# Patient Record
Sex: FEMALE | Race: WHITE | Employment: FULL TIME | URBAN - METROPOLITAN AREA
[De-identification: names, ages, dates, MRNs, and addresses within clinical notes are randomized per-mention and may not be internally consistent; named-entity substitution may affect disease eponyms.]

---

## 2021-08-17 ENCOUNTER — TRANSCRIBE ORDER (OUTPATIENT)
Dept: SCHEDULING | Age: 51
End: 2021-08-17

## 2021-08-17 DIAGNOSIS — Z12.31 VISIT FOR SCREENING MAMMOGRAM: Primary | ICD-10-CM

## 2021-08-28 ENCOUNTER — HOSPITAL ENCOUNTER (OUTPATIENT)
Dept: MAMMOGRAPHY | Age: 51
Discharge: HOME | End: 2021-08-28
Attending: OBSTETRICS & GYNECOLOGY
Payer: COMMERCIAL

## 2021-08-28 DIAGNOSIS — Z12.31 VISIT FOR SCREENING MAMMOGRAM: ICD-10-CM

## 2021-08-28 PROCEDURE — 77067 SCR MAMMO BI INCL CAD: CPT

## 2022-12-07 ENCOUNTER — HOSPITAL ENCOUNTER (EMERGENCY)
Dept: CT IMAGING | Age: 52
Discharge: HOME | End: 2022-12-10
Payer: COMMERCIAL

## 2022-12-07 ENCOUNTER — HOSPITAL ENCOUNTER (EMERGENCY)
Age: 52
Discharge: HOME | End: 2022-12-07
Attending: STUDENT IN AN ORGANIZED HEALTH CARE EDUCATION/TRAINING PROGRAM
Payer: COMMERCIAL

## 2022-12-07 VITALS
DIASTOLIC BLOOD PRESSURE: 66 MMHG | OXYGEN SATURATION: 98 % | HEART RATE: 87 BPM | RESPIRATION RATE: 18 BRPM | HEIGHT: 62 IN | SYSTOLIC BLOOD PRESSURE: 134 MMHG | TEMPERATURE: 98.4 F | BODY MASS INDEX: 21.35 KG/M2 | WEIGHT: 116 LBS

## 2022-12-07 DIAGNOSIS — H57.02 ANISOCORIA: Primary | ICD-10-CM

## 2022-12-07 LAB
ANION GAP SERPL CALC-SCNC: 6 MMOL/L (ref 2–11)
BUN SERPL-MCNC: 8 MG/DL (ref 6–23)
CALCIUM SERPL-MCNC: 9.7 MG/DL (ref 8.3–10.4)
CHLORIDE SERPL-SCNC: 100 MMOL/L (ref 101–110)
CO2 SERPL-SCNC: 30 MMOL/L (ref 21–32)
CREAT SERPL-MCNC: 0.74 MG/DL (ref 0.6–1)
GLUCOSE SERPL-MCNC: 101 MG/DL (ref 65–100)
POTASSIUM SERPL-SCNC: 3.9 MMOL/L (ref 3.5–5.1)
SODIUM SERPL-SCNC: 136 MMOL/L (ref 133–143)

## 2022-12-07 PROCEDURE — 80048 BASIC METABOLIC PNL TOTAL CA: CPT

## 2022-12-07 PROCEDURE — 2580000003 HC RX 258: Performed by: EMERGENCY MEDICINE

## 2022-12-07 PROCEDURE — 6360000004 HC RX CONTRAST MEDICATION: Performed by: EMERGENCY MEDICINE

## 2022-12-07 PROCEDURE — 99284 EMERGENCY DEPT VISIT MOD MDM: CPT

## 2022-12-07 PROCEDURE — 99283 EMERGENCY DEPT VISIT LOW MDM: CPT | Performed by: PSYCHIATRY & NEUROLOGY

## 2022-12-07 PROCEDURE — 70450 CT HEAD/BRAIN W/O DYE: CPT

## 2022-12-07 PROCEDURE — 70496 CT ANGIOGRAPHY HEAD: CPT

## 2022-12-07 RX ORDER — SODIUM CHLORIDE 0.9 % (FLUSH) 0.9 %
10 SYRINGE (ML) INJECTION
Status: DISCONTINUED | OUTPATIENT
Start: 2022-12-07 | End: 2022-12-11 | Stop reason: HOSPADM

## 2022-12-07 RX ORDER — 0.9 % SODIUM CHLORIDE 0.9 %
100 INTRAVENOUS SOLUTION INTRAVENOUS
Status: COMPLETED | OUTPATIENT
Start: 2022-12-07 | End: 2022-12-07

## 2022-12-07 RX ORDER — AMOXICILLIN AND CLAVULANATE POTASSIUM 875; 125 MG/1; MG/1
1 TABLET, FILM COATED ORAL 2 TIMES DAILY
Qty: 20 TABLET | Refills: 0 | Status: SHIPPED | OUTPATIENT
Start: 2022-12-07 | End: 2022-12-17

## 2022-12-07 RX ADMIN — IOPAMIDOL 50 ML: 755 INJECTION, SOLUTION INTRAVENOUS at 13:17

## 2022-12-07 RX ADMIN — SODIUM CHLORIDE 100 ML: 9 INJECTION, SOLUTION INTRAVENOUS at 13:18

## 2022-12-07 ASSESSMENT — ENCOUNTER SYMPTOMS
COUGH: 1
NAUSEA: 0
ABDOMINAL PAIN: 0
ABDOMINAL DISTENTION: 0
BACK PAIN: 0
DIARRHEA: 0
EYE DISCHARGE: 0
SHORTNESS OF BREATH: 0
WHEEZING: 0
VOMITING: 0
EYE REDNESS: 0
SORE THROAT: 0
EYE PAIN: 0
ANAL BLEEDING: 0
APNEA: 0
SINUS PRESSURE: 1
COLOR CHANGE: 0
CHEST TIGHTNESS: 0
EYE ITCHING: 0
RHINORRHEA: 0

## 2022-12-07 ASSESSMENT — PAIN - FUNCTIONAL ASSESSMENT: PAIN_FUNCTIONAL_ASSESSMENT: NONE - DENIES PAIN

## 2022-12-07 NOTE — PROGRESS NOTES
Saw patient and reviewed imaging. The patient has about 1 mm of anisocoria. Her left pupil is larger than the right. She may have a mild degree of ptosis, but that is unclear over the computer. I did offer an MRI of the orbits with and without contrast; however, the patient would like to go home and does not want to wait for MRI. I recommend she see a neuro-ophthalmologist, Dr. Santo Pena. His email is as follows:    Jose Bagdad: [email or web address]    Please provide the patient with his email address so that she can set up an appointment. Full note to follow    Consult    Patient: Abner Bustamante MRN: [id number]     YOB: 1970  Age: 47 y.o. Sex: female      Subjective:      Abner Bustamante is a 47 y.o. female who is being seen for anisocoria and ptosis of the left eyelid. The patient noticed around Thanksgiving that she had ptosis of the left eyelid and that her pupils were unequal.  She does state that she was using antihistamine drops in her left eye in the setting of irritation. There is also noticed that her eyelid appeared to be drooping. She saw an eye doctor today who sent her to the emergency department for a CT scan. There is no dilated eye examination. She states that her vision is fairly good but slightly blurry with distant objects. No past medical history on file. Past Surgical History:   Procedure Laterality Date    COLONOSCOPY  2019    GYN      ablation      Family History   Problem Relation Age of Onset    Hypertension Mother     Stomach Cancer Father     Breast Cancer Maternal Aunt 48    Breast Cancer Maternal Aunt 72    Diabetes Maternal Aunt     Colon Cancer Neg Hx     Ovarian Cancer Neg Hx      Social History     Tobacco Use    Smoking status: Former     Packs/day: 0.50     Types: Cigarettes    Smokeless tobacco: Never   Substance Use Topics    Alcohol use: Yes      No current facility-administered medications for this encounter.      Current Outpatient Medications   Medication Sig Dispense Refill    amoxicillin-clavulanate (AUGMENTIN) 875-125 MG per tablet Take 1 tablet by mouth 2 times daily for 10 days 20 tablet 0    Cetirizine HCl (ZYRTEC ALLERGY) 10 MG CAPS Take by mouth       Facility-Administered Medications Ordered in Other Encounters   Medication Dose Route Frequency Provider Last Rate Last Admin    sodium chloride flush 0.9 % injection 10 mL  10 mL IntraVENous ONCE PRN Viridiana Huynh MD            No Known Allergies    Review of Systems:  CONSTITUTIONAL: No weight loss, fever, chills, weakness or fatigue. HEENT: Eyes: As above. Ears, Nose, Throat: No hearing loss, sneezing, congestion, runny nose or sore throat. SKIN: No rash or itching. CARDIOVASCULAR: No chest pain, chest pressure or chest discomfort. No palpitations or edema. Objective:     Vitals:    12/07/22 1148 12/07/22 1615   BP: 125/76 134/66   Pulse: 95 87   Resp: 16 18   Temp: 98.4 °F (36.9 °C)    TempSrc: Oral    SpO2: 98% 98%   Weight: 116 lb (52.6 kg)    Height: 5' 2\" (1.575 m)         Physical Exam:  General - Well developed, well nourished, in no apparent distress. Pleasant and conversant. HEENT - Normocephalic, atraumatic. Neck -No masses   Lungs - Normal work of breathing   Abdomen - No masses   Extremities - No edema and no rashes. Psychiatric - Mood and affect are normal    Neurological examination - Comprehension, attention , memory and reasoning are intact. Language and speech are normal. On cranial nerve examination pupils are equal round and reactive to light (cranial nerve II and III). Visual acuity is adequate (cranial nerve II). Visual fields are full to finger confrontation (CN II). Extraocular motility is normal (CN III, IV, VI). Face is symmetric (CN VII). Hearing is grossly intact to verbal communication (CN VIII). Motor examination - There is normal bulk. Power is full throughout (with spontaneous movement against environmental objects). Cerebellar examination is normal with finger-no-nose testing. Gait and stance are normal.       NIHSS   NIHSS Score: 0  1a-Level of Consciousness 0  1b-What is Month/Age 0  1c-Open/Close Eyes&Hand 0  2 -Best Gaze 0  3 -Visual Fields 0  4 -Facial Palsy 0  5a-Motor-Left Arm 0  5b-Motor-Right Arm 0  6a-Motor-Left Leg 0  6b-Motor-Right Leg 0  7 -Limb Ataxia 0  8 -Sensory 0  9 -Best Language 0  10-Dysarthria 0  11-Extinction/Inattention 0      CT Results (most recent): Personally Reviewed   Results for orders placed during the hospital encounter of 12/07/22    CT HEAD WO CONTRAST    Narrative  Exam: CT HEAD WO CONTRAST on 12/7/2022 1:45 PM    Clinical History: The Female patient is 47years old  presenting for Left  Ptosis, anisocoria. Technique: Thin slice axial CT images through the brain were obtained. All CT scans at this facility are performed using dose reduction/dose modulation  techniques, as appropriate the performed exam, including the following:  Automated Exposure Control; Adjustment of the mA and/or kV according to patient  size (this includes techniques or standardized protocols for targeted exams  where dose is matched to indication/reason for exam); and Use of Iterative  Reconstruction Technique. Radiation Exposure Indices:  Reference Air Kerma (Ka,r) = 1209 mGy-cm    Comparison:  None. Findings:    Cerebrum: Normal brain parenchyma is demonstrated. There is normal gray-white  matter differentiation. No evidence of intracranial hemorrhage, mass, or other  space-occupying lesion is seen. There are no abnormal extra-axial fluid  collections. Cerebellum: Normal cerebellar morphology is demonstrated. CSF spaces: The ventricular system is within normal limits. The basilar cisterns  are unremarkable. Brainstem: No evidence of ischemia, hemorrhage, or mass. Extracranial tissues: Visualized orbits and extracranial soft tissues are  unremarkable.     Paranasal sinuses/Mastoids: Well-pneumatized and aerated. .    Calvarium: No acute osseous abnormality. Impression  1. No acute intracranial abnormality. CPT code 55022             Most recent CTA Personally Reviewed  Results for orders placed during the hospital encounter of 12/07/22    CTA HEAD NECK W WO CONTRAST    Narrative  Title:  CT arteriogram of the neck and head. Indication: Left ptosis and anisocoria. Technique: Axial images of the neck and head were obtained after the uneventful  administration of intravenous iodinated contrast media. Contrast was used to  best identify the arterial structures. Images were reviewed on a separate, free  standing, three-dimensional workstation as per the referring physicians request.      All stenosis percentages derived by comparing the narrowest segment with the  distal Internal Carotid Artery luminal diameter, as described in the [place]  American Symptomatic Carotid Endarterectomy Trial (NASCET) criteria. All CT scans at this facility are performed using dose reduction/dose modulation  techniques, as appropriate the performed exam, including the following:  Automated Exposure Control; Adjustment of the mA and/or kV according to patient  size (this includes techniques or standardized protocols for targeted exams  where dose is matched to indication/reason for exam); and Use of Iterative  Reconstruction Technique. Comparison: Head CT same date. Findings:  Lungs:  Clear. Bones:  No destructive lesion. Paranasal sinuses:  Mild mucosal thickening versus a small polyp in the left  maxillary sinus. Brain:  No mass affect. Soft tissues:  Unremarkable. Superior sagittal sinus:  Patent. Right transverse sinus:  Patent. Left transverse sinus:  Patent. Aortic arch:  Mild atherosclerotic disease, patent. Right brachiocephalic artery:  Patent. Right subclavian artery:  Partially obscured, probably patent. Left subclavian artery:  Patent.     Right common carotid artery: Motion artifact, patent. Right external carotid artery:  Motion artifact, patent. Cervical Right internal carotid artery:  Motion artifact, beam hardening  artifact, patent. Left common carotid artery: Patent. Left external carotid artery:  Motion artifact, patent. Cervical Left internal carotid artery:  Motion artifact, beam hardening  artifact, calcification in the carotid bulb, patent. Right vertebral artery:  Beam hardening artifact, patent. Left vertebral artery:  Probable calcification and narrowing at C2 level,  patent. Basilar artery:  Patent. Intracranial right internal carotid artery:  Patent. Right middle cerebral artery:  Patent. Right anterior cerebral artery:  Patent. Anterior communicating artery: Patent. Left anterior cerebral artery:  Patent. Left middle cerebral artery:  Patent. Intracranial left internal carotid artery:  Patent. Right posterior communicating artery: Small diameter, patent. Left posterior communicating artery:  Small diameter, patent. Right posterior cerebral artery:  Patent. Left posterior cerebral artery:  Patent. Impression  No intracranial arterial occlusion, stenosis, or aneurysm. Assessment and Plan    47year-old otherwise healthy patient with anisocoria and mild ptosis of the left eyelid. Examination is limited over the computer. CT and CTA are reassuring. I offered an MRI of the orbits with and without contrast but the patient has been in the emergency department for over 5 hours and does not want to wait. Instead, we will arrange her to see Dr. Wilton English MD who is a neuro-ophthalmologist.  The patient denies any other neurological symptoms. If she does develop worsening symptoms she should come back to the emergency department. No further recommendations, neurology will sign off.

## 2022-12-07 NOTE — ED NOTES
Pt presents to the ED for left eye drooping for 2 weeks and her left eye is dilated for 1 week     Stephanie Blanc RN  12/07/22 8033

## 2022-12-07 NOTE — ED PROVIDER NOTES
Emergency Department Provider Note                   PCP:                Romero Aguilar MD               Age: 47 y.o. Sex: female     No diagnosis found. DISPOSITION          MDM  Number of Diagnoses or Management Options  Diagnosis management comments: 44-year-old female patient presenting with symptoms consistent with Ry syndrome though she does not describe any anhydrosis. CT and CTA have been performed in this department with normal results. Will consult neurology for further discussion  Patient is well out of any intervention window for both tPA and neurovascular intervention at this time. Amount and/or Complexity of Data Reviewed  Clinical lab tests: reviewed and ordered  Tests in the radiology section of CPT®: ordered and reviewed    Risk of Complications, Morbidity, and/or Mortality  Presenting problems: moderate  Diagnostic procedures: low  Management options: moderate    Patient Progress  Patient progress: stable             Orders Placed This Encounter   Procedures    CTA HEAD NECK W WO CONTRAST    CT HEAD WO CONTRAST    BMP        Medications - No data to display    New Prescriptions    No medications on file        Johny Amin is a 47 y.o. female who presents to the Emergency Department with chief complaint of    Chief Complaint   Patient presents with    Eye Problem      44-year-old female patient presenting to this department with reports of drooping of the left upper eyelid and dilation of the left pupil. Patient states she noted these changes initially after taking photos at [facility]. She states her  later than noted dilatation of the left pupil. She is reported some blurred vision which she attributes to the inability to wear her contacts. This blurred vision improves when she wears her reading glasses. She was originally seen at urgent care and then sent to [facility] who evaluated her and sent to this department.   There is no associated pain in the eye.  She denies any obvious numbness tingling or weakness. She does report some gait instability which she attributes again to visual change. Patient denies similar symptoms in the past.  She reports recent upper respiratory congestion with nonproductive cough. She denies fever or chills. The history is provided by the patient. No  was used. Review of Systems   Constitutional:  Negative for activity change, appetite change, chills, fatigue and fever. HENT:  Positive for congestion and sinus pressure. Negative for ear discharge, ear pain, rhinorrhea and sore throat. Eyes:  Positive for visual disturbance. Negative for pain, discharge, redness and itching. Respiratory:  Positive for cough. Negative for apnea, chest tightness, shortness of breath and wheezing. Cardiovascular:  Negative for chest pain, palpitations and leg swelling. Gastrointestinal:  Negative for abdominal distention, abdominal pain, anal bleeding, diarrhea, nausea and vomiting. Genitourinary:  Negative for difficulty urinating, dysuria, flank pain, frequency, hematuria, pelvic pain, vaginal bleeding and vaginal discharge. Musculoskeletal:  Negative for arthralgias, back pain, myalgias, neck pain and neck stiffness. Skin:  Negative for color change, pallor, rash and wound. Neurological:  Negative for dizziness, tremors, facial asymmetry, weakness, light-headedness, numbness and headaches. Psychiatric/Behavioral:  Negative for agitation, behavioral problems, confusion, self-injury and suicidal ideas. The patient is not nervous/anxious. All other systems reviewed and are negative. No past medical history on file.      Past Surgical History:   Procedure Laterality Date    COLONOSCOPY  2019    GYN      ablation        Family History   Problem Relation Age of Onset    Hypertension Mother     Stomach Cancer Father     Breast Cancer Maternal Aunt 48    Breast Cancer Maternal Aunt 72    Diabetes Maternal Aunt     Colon Cancer Neg Hx     Ovarian Cancer Neg Hx         Social History     Socioeconomic History    Marital status:    Tobacco Use    Smoking status: Former     Packs/day: 0.50     Types: Cigarettes    Smokeless tobacco: Never   Substance and Sexual Activity    Alcohol use: Yes         Patient has no known allergies. Previous Medications    CETIRIZINE HCL (ZYRTEC ALLERGY) 10 MG CAPS    Take by mouth        Vitals signs and nursing note reviewed. Patient Vitals for the past 4 hrs:   Temp Pulse Resp BP SpO2   12/07/22 1148 98.4 °F (36.9 °C) 95 16 125/76 98 %          Physical Exam  Vitals and nursing note reviewed. Constitutional:       General: She is not in acute distress. Appearance: Normal appearance. She is normal weight. She is not ill-appearing or toxic-appearing. Comments: Generally well-appearing, alert and oriented x4. No acute distress, speaks in clear, fluid sentences. HENT:      Head: Normocephalic and atraumatic. Right Ear: External ear normal.      Left Ear: External ear normal.      Nose: Nose normal.      Mouth/Throat:      Mouth: Mucous membranes are moist.   Eyes:      General: No visual field deficit or scleral icterus. Right eye: No discharge. Left eye: No discharge. Extraocular Movements: Extraocular movements intact. Comments: Slight dilatation of the left pupil with comparison to the right. Pupils are react to light normally. Extraocular muscle movements are intact on exam.  Subtle ptosis noted to the left eye as well. Cardiovascular:      Rate and Rhythm: Normal rate and regular rhythm. Pulses: Normal pulses. Heart sounds: Normal heart sounds. Pulmonary:      Effort: Pulmonary effort is normal. No tachypnea, bradypnea, accessory muscle usage, prolonged expiration or respiratory distress. Breath sounds: Normal breath sounds and air entry. No stridor.  No decreased breath sounds, wheezing, rhonchi or scans at this facility are performed using dose reduction/dose modulation  techniques, as appropriate the performed exam, including the following:   Automated Exposure Control; Adjustment of the mA and/or kV according to patient  size (this includes techniques or standardized protocols for targeted exams  where dose is matched to indication/reason for exam); and Use of Iterative  Reconstruction Technique. Comparison: Head CT same date. Findings:   Lungs:  Clear. Bones:  No destructive lesion. Paranasal sinuses:  Mild mucosal thickening versus a small polyp in the left  maxillary sinus. Brain:  No mass affect. Soft tissues:  Unremarkable. Superior sagittal sinus:  Patent. Right transverse sinus:  Patent. Left transverse sinus:  Patent. Aortic arch:  Mild atherosclerotic disease, patent. Right brachiocephalic artery:  Patent. Right subclavian artery:  Partially obscured, probably patent. Left subclavian artery:  Patent. Right common carotid artery:  Motion artifact, patent. Right external carotid artery:  Motion artifact, patent. Cervical Right internal carotid artery:  Motion artifact, beam hardening  artifact, patent. Left common carotid artery: Patent. Left external carotid artery:  Motion artifact, patent. Cervical Left internal carotid artery:  Motion artifact, beam hardening  artifact, calcification in the carotid bulb, patent. Right vertebral artery:  Beam hardening artifact, patent. Left vertebral artery:  Probable calcification and narrowing at C2 level,  patent. Basilar artery:  Patent. Intracranial right internal carotid artery:  Patent. Right middle cerebral artery:  Patent. Right anterior cerebral artery:  Patent. Anterior communicating artery: Patent. Left anterior cerebral artery:  Patent. Left middle cerebral artery:  Patent. Intracranial left internal carotid artery:  Patent.     Right posterior communicating artery: Small diameter, patent. Left posterior communicating artery:  Small diameter, patent. Right posterior cerebral artery:  Patent. Left posterior cerebral artery:  Patent. Impression    No intracranial arterial occlusion, stenosis, or aneurysm. CT HEAD WO CONTRAST    Narrative    Exam: CT HEAD WO CONTRAST on 12/7/2022 1:45 PM    Clinical History: The Female patient is 47years old  presenting for Left  Ptosis, anisocoria. Technique: Thin slice axial CT images through the brain were obtained. All CT scans at this facility are performed using dose reduction/dose modulation  techniques, as appropriate the performed exam, including the following:   Automated Exposure Control; Adjustment of the mA and/or kV according to patient  size (this includes techniques or standardized protocols for targeted exams  where dose is matched to indication/reason for exam); and Use of Iterative  Reconstruction Technique. Radiation Exposure Indices:  Reference Air Kerma (Ka,r) = 1209 mGy-cm    Comparison:  None. Findings:      Cerebrum: Normal brain parenchyma is demonstrated. There is normal gray-white  matter differentiation. No evidence of intracranial hemorrhage, mass, or other  space-occupying lesion is seen. There are no abnormal extra-axial fluid  collections. Cerebellum: Normal cerebellar morphology is demonstrated. CSF spaces: The ventricular system is within normal limits. The basilar cisterns  are unremarkable. Brainstem: No evidence of ischemia, hemorrhage, or mass. Extracranial tissues: Visualized orbits and extracranial soft tissues are  unremarkable. Paranasal sinuses/Mastoids: Well-pneumatized and aerated. .    Calvarium: No acute osseous abnormality. Impression    1. No acute intracranial abnormality.     CPT code 88679       BMP   Result Value Ref Range    Sodium 136 133 - 143 mmol/L    Potassium 3.9 3.5 - 5.1 mmol/L    Chloride 100 (L) 101 - 110 mmol/L    CO2 30 21 - 32 mmol/L    Anion Gap 6 2 - 11 mmol/L    Glucose 101 (H) 65 - 100 mg/dL    BUN 8 6 - 23 MG/DL    Creatinine 0.74 0.6 - 1.0 MG/DL    Est, Glom Filt Rate >60 >60 ml/min/1.73m2    Calcium 9.7 8.3 - 10.4 MG/DL        CTA HEAD NECK W WO CONTRAST   Final Result   No intracranial arterial occlusion, stenosis, or aneurysm. CT HEAD WO CONTRAST   Final Result      1. No acute intracranial abnormality. CPT code 52244                                Voice dictation software was used during the making of this note. This software is not perfect and grammatical and other typographical errors may be present. This note has not been completely proofread for errors.      [address] [facility]  12/07/22 1255

## 2022-12-07 NOTE — DISCHARGE INSTR - COC
Continuity of Care Form    Patient Name: Trina Rob   :  1970  MRN:  [id number]    Admit date:  2022  Discharge date:  ***    Code Status Order: No Order   Advance Directives:     Admitting Physician:  No admitting provider for patient encounter. PCP: Kady Hernandez MD    Discharging Nurse: [facility] Unit/Room#: ER06/06  Discharging Unit Phone Number: ***    Emergency Contact:   Extended Emergency Contact Information  Primary Emergency Contact: [address] [place] [address]  Mobile Phone: [phone]  Relation: Spouse    Past Surgical History:  Past Surgical History:   Procedure Laterality Date    COLONOSCOPY  2019    GYN      ablation       Immunization History: There is no immunization history on file for this patient. Active Problems: There is no problem list on file for this patient. Isolation/Infection:   Isolation            No Isolation          Patient Infection Status       None to display            Nurse Assessment:  Last Vital Signs: /66   Pulse 87   Temp 98.4 °F (36.9 °C) (Oral)   Resp 18   Ht 5' 2\" (1.575 m)   Wt 116 lb (52.6 kg)   SpO2 98%   BMI 21.22 kg/m²     Last documented pain score (0-10 scale):    Last Weight:   Wt Readings from Last 1 Encounters:   22 116 lb (52.6 kg)     Mental Status:  {IP PT MENTAL STATUS:04463}    IV Access:  { SHELLEY IV ACCESS:798173996}    Nursing Mobility/ADLs:  Walking   {CHP DME EJYL:274265638}  Transfer  {CHP DME QWFJ:653208161}  Bathing  {CHP DME YBFM:363706361}  Dressing  {CHP DME JLDF:683899953}  Toileting  {CHP DME GEETA:897012377}  Feeding  {CHP DME NPWM:148446329}  Med Admin  {P DME NBIM:134625718}  Med Delivery   { SHELLEY MED Delivery:383135819}    Wound Care Documentation and Therapy:        Elimination:  Continence:    Bowel: {YES / VY:96269}  Bladder: {YES / IN:33651}  Urinary Catheter: {Urinary Catheter:366650623}   Colostomy/Ileostomy/Ileal Conduit: {YES / RZ:13949}       Date of Last BM: ***  No intake requires {Admit to Appropriate Level of Care:31497} for {GREATER/LESS:764846053} 30 days.      Update Admission H&P: {CHP DME Changes in HRLCP:268924905}    PHYSICIAN SIGNATURE:  {Esignature:524734010}

## 2022-12-07 NOTE — DISCHARGE INSTRUCTIONS
CT imaging and CTA imaging today are stable. You do require outpatient MRI imaging. You have been referred to a neuro-ophthalmologist and should arrange follow-up with this provider. Please return to this department for worsening symptoms, concerns or questions.

## 2022-12-07 NOTE — ED TRIAGE NOTES
Patient was sent from [facility] for a CT scan. Pt reports left eye \"drooping\" x2 weeks and unequal pupils, Left pupil was more dilated x1 week. Carson Molina MD notified.

## 2024-06-03 SDOH — ECONOMIC STABILITY: FOOD INSECURITY: WITHIN THE PAST 12 MONTHS, YOU WORRIED THAT YOUR FOOD WOULD RUN OUT BEFORE YOU GOT MONEY TO BUY MORE.: NEVER TRUE

## 2024-06-03 SDOH — ECONOMIC STABILITY: HOUSING INSECURITY
IN THE LAST 12 MONTHS, WAS THERE A TIME WHEN YOU DID NOT HAVE A STEADY PLACE TO SLEEP OR SLEPT IN A SHELTER (INCLUDING NOW)?: NO

## 2024-06-03 SDOH — ECONOMIC STABILITY: TRANSPORTATION INSECURITY
IN THE PAST 12 MONTHS, HAS LACK OF TRANSPORTATION KEPT YOU FROM MEETINGS, WORK, OR FROM GETTING THINGS NEEDED FOR DAILY LIVING?: NO

## 2024-06-03 SDOH — ECONOMIC STABILITY: FOOD INSECURITY: WITHIN THE PAST 12 MONTHS, THE FOOD YOU BOUGHT JUST DIDN'T LAST AND YOU DIDN'T HAVE MONEY TO GET MORE.: NEVER TRUE

## 2024-06-03 SDOH — ECONOMIC STABILITY: INCOME INSECURITY: HOW HARD IS IT FOR YOU TO PAY FOR THE VERY BASICS LIKE FOOD, HOUSING, MEDICAL CARE, AND HEATING?: NOT HARD AT ALL

## 2024-06-06 ENCOUNTER — OFFICE VISIT (OUTPATIENT)
Dept: OBGYN CLINIC | Age: 54
End: 2024-06-06
Payer: COMMERCIAL

## 2024-06-06 VITALS
SYSTOLIC BLOOD PRESSURE: 132 MMHG | BODY MASS INDEX: 20.8 KG/M2 | HEIGHT: 62 IN | DIASTOLIC BLOOD PRESSURE: 78 MMHG | WEIGHT: 113 LBS

## 2024-06-06 DIAGNOSIS — N95.2 POST-MENOPAUSAL ATROPHIC VAGINITIS: ICD-10-CM

## 2024-06-06 DIAGNOSIS — Z75.8 DOES NOT HAVE PRIMARY CARE PROVIDER: Primary | ICD-10-CM

## 2024-06-06 DIAGNOSIS — Z12.4 CERVICAL CANCER SCREENING: ICD-10-CM

## 2024-06-06 DIAGNOSIS — Z01.419 WELL WOMAN EXAM WITH ROUTINE GYNECOLOGICAL EXAM: Primary | ICD-10-CM

## 2024-06-06 DIAGNOSIS — Z12.31 ENCOUNTER FOR SCREENING MAMMOGRAM FOR MALIGNANT NEOPLASM OF BREAST: ICD-10-CM

## 2024-06-06 PROCEDURE — 99459 PELVIC EXAMINATION: CPT | Performed by: OBSTETRICS & GYNECOLOGY

## 2024-06-06 PROCEDURE — 99396 PREV VISIT EST AGE 40-64: CPT | Performed by: OBSTETRICS & GYNECOLOGY

## 2024-06-06 RX ORDER — ESTRADIOL 10 UG/1
10 INSERT VAGINAL DAILY
Qty: 14 TABLET | Refills: 0 | Status: SHIPPED | OUTPATIENT
Start: 2024-06-06

## 2024-06-06 RX ORDER — VALACYCLOVIR HYDROCHLORIDE 1 G/1
TABLET, FILM COATED ORAL
COMMUNITY
Start: 2024-05-31

## 2024-06-06 RX ORDER — ESTRADIOL 10 UG/1
10 INSERT VAGINAL
Qty: 36 TABLET | Refills: 4 | Status: SHIPPED | OUTPATIENT
Start: 2024-06-07

## 2024-06-06 ASSESSMENT — ENCOUNTER SYMPTOMS
SHORTNESS OF BREATH: 0
EYES NEGATIVE: 1
ABDOMINAL PAIN: 0
GASTROINTESTINAL NEGATIVE: 1
COUGH: 0
RESPIRATORY NEGATIVE: 1
ALLERGIC/IMMUNOLOGIC NEGATIVE: 1
BLOOD IN STOOL: 0

## 2024-06-06 ASSESSMENT — PATIENT HEALTH QUESTIONNAIRE - PHQ9
SUM OF ALL RESPONSES TO PHQ QUESTIONS 1-9: 0
SUM OF ALL RESPONSES TO PHQ9 QUESTIONS 1 & 2: 0
SUM OF ALL RESPONSES TO PHQ QUESTIONS 1-9: 0
1. LITTLE INTEREST OR PLEASURE IN DOING THINGS: NOT AT ALL
2. FEELING DOWN, DEPRESSED OR HOPELESS: NOT AT ALL
SUM OF ALL RESPONSES TO PHQ QUESTIONS 1-9: 0
SUM OF ALL RESPONSES TO PHQ QUESTIONS 1-9: 0

## 2024-06-06 NOTE — PROGRESS NOTES
Johanna Hernandes is 54 y.o. female, No obstetric history on file., who presents today for a routine annual gynecological examination.No LMP recorded (lmp unknown). Patient is postmenopausal. . Last seen 2.5 yrs ago (had her last gyn exam with PCP) -- reports worsening vaginal dryness causing discomfort with intercourse. Ow Doing well.  No Gyn, Breast, G/U, or GI complaints.       Date of last Mammogram: 2023   - result: Normal  Date of last Cervical Cancer screen (HPV or PAP): 10/4/2021         2024     9:00 AM   Mammogram/Pap Hx   Mammogram Date 2021   Mammogram Result wnl   Pap Date 10/4/2021   Pap Result wnl         2024     9:00 AM   GYN Intake Questionnaire   Current Form of Contraception None   Date of Mammogram 2021   Result of Mammogram wnl   Date of Pap 10/4/2021   Pap result wnl       Contraception: postmenopausal  Has received Gardisil: NO  Last Lissie : No colonoscopy on file   No cologuard on file  No FIT/FOBT on file   No flexible sigmoidoscopy on file  Last time cholesterol was checked: 1 years ago    OB History:   OB History    Para Term  AB Living   2   1   1     SAB IAB Ectopic Molar Multiple Live Births                       Health Maintenance Due   Topic Date Due    Hepatitis B vaccine (1 of 3 - 3-dose series) Never done    COVID-19 Vaccine (1) Never done    Depression Screen  Never done    HIV screen  Never done    Hepatitis C screen  Never done    DTaP/Tdap/Td vaccine (1 - Tdap) Never done    Lipids  Never done    Colorectal Cancer Screen  Never done    Shingles vaccine (1 of 2) Never done         GYN History            Johanna  has no past medical history on file. .    Her surgeries include  has a past surgical history that includes Colonoscopy (2019) and gyn..    No Known Allergies     Her current meds are:   Current Outpatient Medications   Medication Sig    valACYclovir (VALTREX) 1 g tablet     [START ON 2024] Estradiol (VAGIFEM) 10 MCG TABS vaginal tablet

## 2024-06-17 LAB
COLLECTION METHOD: NORMAL
CYTOLOGIST CVX/VAG CYTO: NORMAL
CYTOLOGY CVX/VAG DOC THIN PREP: NORMAL
HPV REFLEX: NORMAL
Lab: NORMAL
Lab: NORMAL
OTHER PT INFO: NORMAL
PAP SOURCE: NORMAL
PATH REPORT.FINAL DX SPEC: NORMAL
PREV CYTO INFO: NORMAL
PREV TREATMENT RESULTS: NORMAL
PREV TREATMENT: NORMAL
STAT OF ADQ CVX/VAG CYTO-IMP: NORMAL

## 2024-06-28 ENCOUNTER — OFFICE VISIT (OUTPATIENT)
Dept: ORTHOPEDIC SURGERY | Age: 54
End: 2024-06-28

## 2024-06-28 VITALS — HEIGHT: 62 IN | WEIGHT: 113 LBS | BODY MASS INDEX: 20.8 KG/M2

## 2024-06-28 DIAGNOSIS — R29.2 HYPERREFLEXIA: ICD-10-CM

## 2024-06-28 DIAGNOSIS — M79.81 ACHENBACH'S SYNDROME: ICD-10-CM

## 2024-06-28 DIAGNOSIS — M54.12 CERVICAL RADICULOPATHY: Primary | ICD-10-CM

## 2024-06-28 RX ORDER — PREDNISONE 10 MG/1
10 TABLET ORAL SEE ADMIN INSTRUCTIONS
Qty: 48 EACH | Refills: 0 | Status: SHIPPED | OUTPATIENT
Start: 2024-06-28 | End: 2024-07-10

## 2024-06-28 NOTE — PROGRESS NOTES
Name: Johanna Hernandes  YOB: 1970  Gender: female  MRN: [id number]    CC:   Chief Complaint   Patient presents with    New Patient     Neck pain         HPI:   Johanna Hernandes is a 54 y.o. female with complaints of rating pain to the right arm and hand discoloration of the ring finger of the right hand for the last month.  She began noticed some rating pain down the right arm roughly a month ago.  Around this time she incidentally noticed a darkening discoloration on two thirds of the ring finger.  Denies any specific trauma or injury.  No exposure to cold she can think of, this is summertime.  Her hand has been painful.  She was seen at an urgent care and she was given a few days of steroids.  Her pain is partially improved but she still has pain in the arm and hand.  The bruising and darkening of the finger is improving notably.  Notably better today but still discolored.  No history of trauma that she knows of.  She has been somewhat guarded using her hand, fearful of pain or injury further.  She is active and likes to exercise but is unable to do this since this injury and pain is onset.  She is undergone a home exercise program for the last 6 weeks without meaningful improvement.          Past Medical History Includes: No past medical history on file.,   Past Surgical History:   Procedure Laterality Date    COLONOSCOPY  2019    GYN      ablation     Family History:   Family History   Problem Relation Age of Onset    Hypertension Mother     Stomach Cancer Father     Breast Cancer Maternal Aunt 50    Breast Cancer Maternal Aunt 65    Diabetes Maternal Aunt     Colon Cancer Neg Hx     Ovarian Cancer Neg Hx       Social History:   Social History     Tobacco Use    Smoking status: Former     Types: Cigarettes     Start date:      Quit date:      Years since quittin.5    Smokeless tobacco: Never   Substance Use Topics    Alcohol use: Yes     Alcohol/week: 3.0 - 4.0 standard drinks of alcohol

## 2024-07-24 ENCOUNTER — OFFICE VISIT (OUTPATIENT)
Dept: ORTHOPEDIC SURGERY | Age: 54
End: 2024-07-24
Payer: COMMERCIAL

## 2024-07-24 VITALS — WEIGHT: 113 LBS | HEIGHT: 62 IN | BODY MASS INDEX: 20.8 KG/M2

## 2024-07-24 DIAGNOSIS — M79.81 ACHENBACH'S SYNDROME: ICD-10-CM

## 2024-07-24 DIAGNOSIS — M54.12 CERVICAL RADICULOPATHY: Primary | ICD-10-CM

## 2024-07-24 PROCEDURE — 1036F TOBACCO NON-USER: CPT | Performed by: PHYSICIAN ASSISTANT

## 2024-07-24 PROCEDURE — G8427 DOCREV CUR MEDS BY ELIG CLIN: HCPCS | Performed by: PHYSICIAN ASSISTANT

## 2024-07-24 PROCEDURE — G8420 CALC BMI NORM PARAMETERS: HCPCS | Performed by: PHYSICIAN ASSISTANT

## 2024-07-24 PROCEDURE — 3017F COLORECTAL CA SCREEN DOC REV: CPT | Performed by: PHYSICIAN ASSISTANT

## 2024-07-24 PROCEDURE — 99214 OFFICE O/P EST MOD 30 MIN: CPT | Performed by: PHYSICIAN ASSISTANT

## 2024-07-24 NOTE — PROGRESS NOTES
07/24/24        Name: Johanna Hernandes  YOB: 1970  Gender: female  MRN: [id number]        MRI/IMAGING/STUDY FOLLOWUP    CC: New Patient (Low back pain )       HPI: Johanna Hernandes is a 54 y.o. female who returns for New Patient (Low back pain )           Patient presents to the office today for follow-up to review MRI results.  She notes her neck shoulder and arm pain is much better since taking the steroids.  She feels like her strength is returning as well, almost normal.  The discoloration of her finger has resolved and is also normal for her.  She feels much better.          Meds/PSH/PMH/FH/SH: This information has been reviewed.      ALLERGIES: No Known Allergies           Physical Examination:            Imaging:         MRI Result (most recent):  MRI CERVICAL SPINE WO CONTRAST 07/16/2024    Narrative  MRI CERVICAL SPINE WO CONTRAST - 7/16/2024 8:14 AM - [id number]    INDICATION: Neck pain, right extremity pain and right finger discoloration for 5  to 6 weeks. Radiculopathy, cervical region.    COMPARISON: 6/13/2024 cervical spine radiographs.    TECHNIQUE:  Sagittal T1, T2, STIR and axial T2, gradient echo sequences are  available for review.    FINDINGS:  Vertebral body heights are intact.  No spondylolisthesis seen.  Minimal reversal of the normal lordosis.  Cerebellar tonsils are in normal position.  Spinal cord has normal signal quality.    C2-3:  Negative    C3-4: Minimal posterior disc osteophyte complex results in mild right foraminal  narrowing.    C4-5: Mild posterior disc osteophyte complex results in mild thecal sac  narrowing and moderate right foraminal narrowing.    C5-6: Minimal posterior disc osteophyte complex, mild facet arthropathy results  in mild thecal sac narrowing and mild right foraminal narrowing.    C6-7: Mild posterior disc osteophyte complex, mild facet arthropathy, mild  ligament flavum hypertrophy results in moderate thecal sac narrowing and mild  right,

## 2024-08-17 ENCOUNTER — HOSPITAL ENCOUNTER (OUTPATIENT)
Dept: MAMMOGRAPHY | Age: 54
End: 2024-08-17
Payer: COMMERCIAL

## 2024-08-17 VITALS — WEIGHT: 115 LBS | HEIGHT: 62 IN | BODY MASS INDEX: 21.16 KG/M2

## 2024-08-17 DIAGNOSIS — Z12.31 ENCOUNTER FOR SCREENING MAMMOGRAM FOR MALIGNANT NEOPLASM OF BREAST: ICD-10-CM

## 2024-08-17 PROCEDURE — 77063 BREAST TOMOSYNTHESIS BI: CPT
